# Patient Record
Sex: MALE | URBAN - METROPOLITAN AREA
[De-identification: names, ages, dates, MRNs, and addresses within clinical notes are randomized per-mention and may not be internally consistent; named-entity substitution may affect disease eponyms.]

---

## 2021-09-10 ENCOUNTER — OFFICE VISIT (OUTPATIENT)
Dept: URBAN - METROPOLITAN AREA CLINIC 6 | Facility: CLINIC | Age: 51
End: 2021-09-10
Payer: COMMERCIAL

## 2021-09-10 DIAGNOSIS — E11.9 DIABETES MELLITUS TYPE 2 WITHOUT MENTION OF COMPLICATION: Primary | ICD-10-CM

## 2021-09-10 DIAGNOSIS — H11.153 PINGUECULA, BILATERAL: ICD-10-CM

## 2021-09-10 DIAGNOSIS — H25.013 CORTICAL AGE-RELATED CATARACT, BILATERAL: ICD-10-CM

## 2021-09-10 PROCEDURE — 92004 COMPRE OPH EXAM NEW PT 1/>: CPT | Performed by: OPTOMETRIST

## 2021-09-10 ASSESSMENT — INTRAOCULAR PRESSURE
OS: 15
OD: 13

## 2021-09-10 NOTE — IMPRESSION/PLAN
Impression: Pinguecula, bilateral: H11.153. Plan: recommended UV protection when outdoors (hat / UV rated sunglasses). Discussed the benefit of using lubricant eye drops.

## 2021-09-10 NOTE — IMPRESSION/PLAN
Impression: Diabetes mellitus Type 2 without mention of complication: C27.0. Plan: Discussed the pathophysiology of diabetes and its effect on the eye. Stressed the importance of strong glucose control. Advised of importance of scheduled dilated examinations, and to contact us immediately for any problems or concerns.